# Patient Record
Sex: MALE | Race: WHITE | Employment: FULL TIME | URBAN - METROPOLITAN AREA
[De-identification: names, ages, dates, MRNs, and addresses within clinical notes are randomized per-mention and may not be internally consistent; named-entity substitution may affect disease eponyms.]

---

## 2017-06-14 ENCOUNTER — HOSPITAL ENCOUNTER (OUTPATIENT)
Dept: OCCUPATIONAL MEDICINE | Age: 32
Discharge: HOME | End: 2017-06-14
Attending: PREVENTIVE MEDICINE

## 2017-06-14 DIAGNOSIS — Z00.00 ROUTINE GENERAL MEDICAL EXAMINATION AT A HEALTH CARE FACILITY: ICD-10-CM

## 2018-06-13 ENCOUNTER — HOSPITAL ENCOUNTER (OUTPATIENT)
Dept: OCCUPATIONAL MEDICINE | Age: 33
Discharge: HOME | End: 2018-06-13
Attending: PREVENTIVE MEDICINE

## 2018-06-13 DIAGNOSIS — Z00.00 ANNUAL PHYSICAL EXAM: ICD-10-CM

## 2019-06-13 ENCOUNTER — HOSPITAL ENCOUNTER (OUTPATIENT)
Dept: OCCUPATIONAL MEDICINE | Age: 34
Discharge: HOME | End: 2019-06-13
Attending: PREVENTIVE MEDICINE

## 2019-06-13 DIAGNOSIS — Z00.00 ROUTINE GENERAL MEDICAL EXAMINATION AT A HEALTH CARE FACILITY: ICD-10-CM

## 2020-07-17 ENCOUNTER — HOSPITAL ENCOUNTER (OUTPATIENT)
Dept: OCCUPATIONAL MEDICINE | Age: 35
Discharge: HOME | End: 2020-07-17
Attending: PREVENTIVE MEDICINE

## 2020-07-17 DIAGNOSIS — Z00.00 ANNUAL PHYSICAL EXAM: ICD-10-CM

## 2021-06-09 ENCOUNTER — HOSPITAL ENCOUNTER (OUTPATIENT)
Dept: OCCUPATIONAL MEDICINE | Age: 36
Discharge: HOME | End: 2021-06-09
Attending: PREVENTIVE MEDICINE

## 2021-06-09 DIAGNOSIS — Z00.00 ANNUAL PHYSICAL EXAM: ICD-10-CM

## 2025-06-16 ENCOUNTER — HOSPITAL ENCOUNTER (OUTPATIENT)
Facility: HOSPITAL | Age: 40
Setting detail: INFUSION SERIES
Discharge: HOME | End: 2025-06-16
Payer: COMMERCIAL

## 2025-06-16 VITALS
RESPIRATION RATE: 18 BRPM | TEMPERATURE: 97.6 F | HEART RATE: 62 BPM | OXYGEN SATURATION: 96 % | SYSTOLIC BLOOD PRESSURE: 131 MMHG | DIASTOLIC BLOOD PRESSURE: 96 MMHG

## 2025-06-16 PROCEDURE — 99195 PHLEBOTOMY: CPT

## 2025-06-16 PROCEDURE — 2580000003 HC RX 258: Performed by: REGISTERED NURSE

## 2025-06-16 RX ORDER — SODIUM CHLORIDE 9 MG/ML
INJECTION, SOLUTION INTRAVENOUS ONCE
Status: COMPLETED | OUTPATIENT
Start: 2025-06-16 | End: 2025-06-16

## 2025-06-16 RX ORDER — ANASTROZOLE 1 MG/1
TABLET ORAL
COMMUNITY
Start: 2025-06-06

## 2025-06-16 RX ORDER — LOSARTAN/HYDROCHLOROTHIAZIDE 100MG-25MG
1 TABLET ORAL DAILY
COMMUNITY
Start: 2024-08-12

## 2025-06-16 RX ORDER — TESTOSTERONE CYPIONATE 200 MG/ML
1 INJECTION, SOLUTION INTRAMUSCULAR
COMMUNITY
Start: 2024-10-24

## 2025-06-16 RX ORDER — ATORVASTATIN CALCIUM 40 MG/1
40 TABLET, FILM COATED ORAL NIGHTLY
COMMUNITY
Start: 2024-10-28 | End: 2025-10-28

## 2025-06-16 RX ADMIN — SODIUM CHLORIDE: 0.9 INJECTION, SOLUTION INTRAVENOUS at 16:36

## 2025-06-16 NOTE — PROGRESS NOTES
[facility] Progress Note    Date: 2025    Name: Lowell Nation    MRN: [id number]         : 1985    Therapeutic Phlebotomy    Mr. Nation arrived to [facility] at 1330, ambulatory.    Mr. Nation was assessed and education was provided.     Mr. Nation's vitals were reviewed.  Vitals:    25 1340   BP: (!) 131/96   Pulse: 62   Resp: 18   Temp: 97.6 °F (36.4 °C)   SpO2: 96%       Blood drawn for labs via hand left, condition patent and no redness venipuncture x2 attempts. Specimen clotted, unable to process.     Labs from 25 reviewed:  Hct 52.    PIV occluded, removed intact. Gauze and coban to site.    22 g PIV placed in right hand x 1 attempt. PIV flushed easily and had brisk blood return.    Therapeutic phlebotomy initiated at 1449 via 22g IV inserted in patient's right hand. At 1530 PIV occluded and was unable to flush. PIV removed, intact. Gauze and coban to site.    20 g PIV placed in right upper arm x 1 attempt. PIV flushed easily and had brisk blood return. Therapeutic phlebotomy resumed at 1540 and ended at 1636 with approximately 500 ml of blood obtained followed by 500 ml of NS administered as post-phlebotomy hydration per orders.    IV removed/ intact.  Gauze/ coban to site.    Pt offered snack/ drink throughout his visit.    Mr. Nation tolerated well without complaints.    [facility] in stable condition at 1713.  He is to return on 25 at 1400 for his next appointment.    Catie Alcala RN  2025

## 2025-06-23 ENCOUNTER — HOSPITAL ENCOUNTER (OUTPATIENT)
Facility: HOSPITAL | Age: 40
Setting detail: INFUSION SERIES
Discharge: HOME | End: 2025-06-23
Payer: COMMERCIAL

## 2025-06-23 VITALS
DIASTOLIC BLOOD PRESSURE: 98 MMHG | HEART RATE: 88 BPM | TEMPERATURE: 97.6 F | RESPIRATION RATE: 16 BRPM | OXYGEN SATURATION: 96 % | SYSTOLIC BLOOD PRESSURE: 140 MMHG

## 2025-06-23 LAB
BASO+EOS+MONOS # BLD AUTO: 0.5 K/UL (ref 0–2.3)
BASO+EOS+MONOS NFR BLD AUTO: 7 % (ref 0.1–17)
DIFFERENTIAL METHOD BLD: ABNORMAL
ERYTHROCYTE [DISTWIDTH] IN BLOOD BY AUTOMATED COUNT: 13.3 % (ref 11.5–14.5)
HCT VFR BLD AUTO: 44.6 % (ref 36–48)
HGB BLD-MCNC: 15.3 G/DL (ref 12–16)
LYMPHOCYTES # BLD: 1.8 K/UL (ref 1.1–5.9)
LYMPHOCYTES NFR BLD: 27.4 % (ref 14–44)
MCH RBC QN AUTO: 29.9 PG (ref 25–35)
MCHC RBC AUTO-ENTMCNC: 34.3 G/DL (ref 31–37)
MCV RBC AUTO: 87.3 FL (ref 78–102)
NEUTS SEG # BLD: 4.4 K/UL (ref 1.8–9.5)
NEUTS SEG NFR BLD: 65.6 % (ref 40–70)
PLATELET # BLD AUTO: 274 K/UL (ref 140–440)
RBC # BLD AUTO: 5.11 M/UL (ref 4.1–5.1)
WBC # BLD AUTO: 6.7 K/UL (ref 4.5–13)

## 2025-06-23 PROCEDURE — 36415 COLL VENOUS BLD VENIPUNCTURE: CPT

## 2025-06-23 PROCEDURE — 85025 COMPLETE CBC W/AUTO DIFF WBC: CPT

## 2025-06-23 RX ORDER — SEMAGLUTIDE 0.25 MG/.5ML
INJECTION, SOLUTION SUBCUTANEOUS
COMMUNITY
Start: 2025-06-12

## 2025-06-23 NOTE — PROGRESS NOTES
[facility] Progress Note    Date: 2025    Name: Lowell Nation    MRN: [id number]         : 1985    Therapeutic Phlebotomy    Mr. Nation arrived to [facility] at 1334, ambulatory.    Mr. Nation was assessed and education was provided.     Mr. Nation's vitals were reviewed.  Vitals:    25 1336   BP: (!) 140/98   Pulse: 88   Resp: 16   Temp: 97.6 °F (36.4 °C)   SpO2: 96%     22 g PIV placed in LAC x 1 attempt. PIV flushed easily and had brisk blood return.  Labs drawn per order (CBC).     Lab results reviewed.  Recent Results (from the past 12 hours)   CBC with Partial Differential    Collection Time: 25  1:43 PM   Result Value Ref Range    WBC 6.7 4.5 - 13.0 K/uL    RBC 5.11 (H) 4.10 - 5.10 M/uL    Hemoglobin 15.3 12.0 - 16.0 g/dL    Hematocrit 44.6 36 - 48 %    MCV 87.3 78 - 102 FL    MCH 29.9 25.0 - 35.0 PG    MCHC 34.3 31 - 37 g/dL    RDW 13.3 11.5 - 14.5 %    Platelets 274 140 - 440 K/uL    Neutrophils % 65.6 40 - 70 %    Mixed Cells 7 0.1 - 17 %    Lymphocytes % 27.4 14 - 44 %    Neutrophils Absolute 4.40 1.8 - 9.5 K/UL    ABSOLUTE MIXED CELLS 0.5 0.0 - 2.3 K/uL    Lymphocytes Absolute 1.80 1.1 - 5.9 K/UL    Differential Type AUTOMATED       Hgb 15.3 and Hct 44.6.    IV removed/ intact.  Gauze/ coban to site.    Pt offered snack/ drink throughout his visit.    Mr. Nation tolerated well without complaints.    [facility] in stable condition at 1350.  He is to return on 25 at 1400 for his next appointment.    Catie Alcala RN  2025

## 2025-07-21 ENCOUNTER — HOSPITAL ENCOUNTER (OUTPATIENT)
Facility: HOSPITAL | Age: 40
Setting detail: INFUSION SERIES
Discharge: HOME | End: 2025-07-21
Payer: COMMERCIAL

## 2025-07-21 VITALS
OXYGEN SATURATION: 94 % | TEMPERATURE: 97.9 F | DIASTOLIC BLOOD PRESSURE: 80 MMHG | HEART RATE: 74 BPM | RESPIRATION RATE: 18 BRPM | SYSTOLIC BLOOD PRESSURE: 133 MMHG

## 2025-07-21 LAB
BASO+EOS+MONOS # BLD AUTO: 0.8 K/UL (ref 0–2.3)
BASO+EOS+MONOS NFR BLD AUTO: 11 % (ref 0.1–17)
DIFFERENTIAL METHOD BLD: ABNORMAL
ERYTHROCYTE [DISTWIDTH] IN BLOOD BY AUTOMATED COUNT: 13.1 % (ref 11.5–14.5)
HCT VFR BLD AUTO: 46.2 % (ref 36–48)
HGB BLD-MCNC: 15.9 G/DL (ref 12–16)
LYMPHOCYTES # BLD: 1.9 K/UL (ref 1.1–5.9)
LYMPHOCYTES NFR BLD: 25.8 % (ref 14–44)
MCH RBC QN AUTO: 30.3 PG (ref 25–35)
MCHC RBC AUTO-ENTMCNC: 34.4 G/DL (ref 31–37)
MCV RBC AUTO: 88 FL (ref 78–102)
NEUTS SEG # BLD: 4.5 K/UL (ref 1.8–9.5)
NEUTS SEG NFR BLD: 63.6 % (ref 40–70)
PLATELET # BLD AUTO: 297 K/UL (ref 140–440)
RBC # BLD AUTO: 5.25 M/UL (ref 4.1–5.1)
WBC # BLD AUTO: 7.2 K/UL (ref 4.5–13)

## 2025-07-21 PROCEDURE — 36415 COLL VENOUS BLD VENIPUNCTURE: CPT

## 2025-07-21 PROCEDURE — 85025 COMPLETE CBC W/AUTO DIFF WBC: CPT

## 2025-07-21 NOTE — PROGRESS NOTES
[facility] Progress Note    Date: 2025    Name: Lowell Nation    MRN: [id number]         : 1985    Therapeutic Phlebotomy (held)    Mr. Nation arrived to \[facility]\"" at 1346, ambulatory.    Mr. Nation was assessed and education was provided.     Mr. Nation's vitals were reviewed.  Vitals:    25 1355   BP: 133/80   Pulse: 74   Resp: 18   Temp: 97.9 °F (36.6 °C)   SpO2: 94%     22 g PIV placed in LAC x 1 attempt. PIV flushed easily and had brisk blood return.Labs drawn per order.     Lab results reviewed.  Recent Results (from the past 12 hours)   CBC with Partial Differential    Collection Time: 25  2:01 PM   Result Value Ref Range    WBC 7.2 4.5 - 13.0 K/uL    RBC 5.25 (H) 4.10 - 5.10 M/uL    Hemoglobin 15.9 12.0 - 16.0 g/dL    Hematocrit 46.2 36 - 48 %    MCV 88.0 78 - 102 FL    MCH 30.3 25.0 - 35.0 PG    MCHC 34.4 31 - 37 g/dL    RDW 13.1 11.5 - 14.5 %    Platelets 297 140 - 440 K/uL    Neutrophils % 63.6 40 - 70 %    Mixed Cells 11 0.1 - 17 %    Lymphocytes % 25.8 14 - 44 %    Neutrophils Absolute 4.50 1.8 - 9.5 K/UL    ABSOLUTE MIXED CELLS 0.8 0.0 - 2.3 K/uL    Lymphocytes Absolute 1.90 1.1 - 5.9 K/UL    Differential Type AUTOMATED       Hgb 15.9 and Hct 46.2.TP held today per order.     IV removed/ intact.  Gauze/ coban to site.    Mr. Nation tolerated well without complaints.    [facility] Nation was [facility] in stable condition at 1410.  He is to return on 25 at 1400 for his next appointment.    Anca Dickens RN  2025

## 2025-08-22 ENCOUNTER — HOSPITAL ENCOUNTER (OUTPATIENT)
Facility: HOSPITAL | Age: 40
Setting detail: INFUSION SERIES
Discharge: HOME | End: 2025-08-22
Payer: COMMERCIAL

## 2025-08-22 VITALS
RESPIRATION RATE: 18 BRPM | TEMPERATURE: 97.8 F | OXYGEN SATURATION: 97 % | DIASTOLIC BLOOD PRESSURE: 76 MMHG | HEART RATE: 73 BPM | SYSTOLIC BLOOD PRESSURE: 147 MMHG

## 2025-08-22 LAB
BASO+EOS+MONOS # BLD AUTO: 0.4 K/UL (ref 0–2.3)
BASO+EOS+MONOS NFR BLD AUTO: 6 % (ref 0.1–17)
DIFFERENTIAL METHOD BLD: NORMAL
ERYTHROCYTE [DISTWIDTH] IN BLOOD BY AUTOMATED COUNT: 12.6 % (ref 11.5–14.5)
HCT VFR BLD AUTO: 40.1 % (ref 36–48)
HGB BLD-MCNC: 13.8 G/DL (ref 12–16)
LYMPHOCYTES # BLD: 2 K/UL (ref 1.1–5.9)
LYMPHOCYTES NFR BLD: 31.9 % (ref 14–44)
MCH RBC QN AUTO: 30.1 PG (ref 25–35)
MCHC RBC AUTO-ENTMCNC: 34.4 G/DL (ref 31–37)
MCV RBC AUTO: 87.6 FL (ref 78–102)
NEUTS SEG # BLD: 3.9 K/UL (ref 1.8–9.5)
NEUTS SEG NFR BLD: 62.5 % (ref 40–70)
PLATELET # BLD AUTO: 305 K/UL (ref 140–440)
RBC # BLD AUTO: 4.58 M/UL (ref 4.1–5.1)
WBC # BLD AUTO: 6.3 K/UL (ref 4.5–13)

## 2025-08-22 PROCEDURE — 36415 COLL VENOUS BLD VENIPUNCTURE: CPT

## 2025-08-22 PROCEDURE — 85025 COMPLETE CBC W/AUTO DIFF WBC: CPT

## 2025-08-22 ASSESSMENT — PAIN SCALES - GENERAL: PAINLEVEL_OUTOF10: 0
